# Patient Record
Sex: FEMALE | Race: WHITE | NOT HISPANIC OR LATINO | Employment: FULL TIME | URBAN - NONMETROPOLITAN AREA
[De-identification: names, ages, dates, MRNs, and addresses within clinical notes are randomized per-mention and may not be internally consistent; named-entity substitution may affect disease eponyms.]

---

## 2018-08-07 ENCOUNTER — HOSPITAL ENCOUNTER (EMERGENCY)
Facility: HOSPITAL | Age: 38
Discharge: HOME | End: 2018-08-07
Attending: EMERGENCY MEDICINE | Admitting: EMERGENCY MEDICINE

## 2018-08-07 ENCOUNTER — APPOINTMENT (OUTPATIENT)
Dept: CT IMAGING | Facility: HOSPITAL | Age: 38
End: 2018-08-07

## 2018-08-07 VITALS
HEART RATE: 67 BPM | SYSTOLIC BLOOD PRESSURE: 137 MMHG | TEMPERATURE: 98.3 F | HEIGHT: 66 IN | DIASTOLIC BLOOD PRESSURE: 90 MMHG | OXYGEN SATURATION: 99 % | WEIGHT: 293 LBS | BODY MASS INDEX: 47.09 KG/M2 | RESPIRATION RATE: 16 BRPM

## 2018-08-07 DIAGNOSIS — S16.1XXA STRAIN OF NECK MUSCLE, INITIAL ENCOUNTER: Primary | ICD-10-CM

## 2018-08-07 LAB
B-HCG UR QL: NEGATIVE
INTERNAL NEGATIVE CONTROL: NEGATIVE
INTERNAL POSITIVE CONTROL: POSITIVE
Lab: NORMAL

## 2018-08-07 PROCEDURE — 25010000002 DEXAMETHASONE PER 1 MG: Performed by: EMERGENCY MEDICINE

## 2018-08-07 PROCEDURE — 25010000002 KETOROLAC TROMETHAMINE PER 15 MG: Performed by: EMERGENCY MEDICINE

## 2018-08-07 PROCEDURE — 99283 EMERGENCY DEPT VISIT LOW MDM: CPT

## 2018-08-07 PROCEDURE — 96372 THER/PROPH/DIAG INJ SC/IM: CPT

## 2018-08-07 PROCEDURE — 72125 CT NECK SPINE W/O DYE: CPT

## 2018-08-07 PROCEDURE — 81025 URINE PREGNANCY TEST: CPT | Performed by: EMERGENCY MEDICINE

## 2018-08-07 RX ORDER — DIAZEPAM 2 MG/1
2 TABLET ORAL EVERY 6 HOURS PRN
Qty: 12 TABLET | Refills: 0 | Status: SHIPPED | OUTPATIENT
Start: 2018-08-07

## 2018-08-07 RX ORDER — PREDNISONE 50 MG/1
50 TABLET ORAL DAILY
Qty: 5 TABLET | Refills: 0 | Status: SHIPPED | OUTPATIENT
Start: 2018-08-07

## 2018-08-07 RX ORDER — DEXAMETHASONE SODIUM PHOSPHATE 10 MG/ML
10 INJECTION INTRAMUSCULAR; INTRAVENOUS ONCE
Status: COMPLETED | OUTPATIENT
Start: 2018-08-07 | End: 2018-08-07

## 2018-08-07 RX ORDER — KETOROLAC TROMETHAMINE 30 MG/ML
30 INJECTION, SOLUTION INTRAMUSCULAR; INTRAVENOUS ONCE
Status: COMPLETED | OUTPATIENT
Start: 2018-08-07 | End: 2018-08-07

## 2018-08-07 RX ADMIN — KETOROLAC TROMETHAMINE 30 MG: 30 INJECTION, SOLUTION INTRAMUSCULAR; INTRAVENOUS at 04:20

## 2018-08-07 RX ADMIN — DEXAMETHASONE SODIUM PHOSPHATE 10 MG: 10 INJECTION INTRAMUSCULAR; INTRAVENOUS at 04:21

## 2018-08-07 NOTE — ED PROVIDER NOTES
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 She denies falls or trauma, weakness, numbness, tingling, history of neck surgeries, MVAs, cp, sob or any other issues. She arrives in NAD requesting a \"short of toradol right in my butt.\"         Family, social and past history reviewed as below, prior documentation of H and Ps and other documentation are reviewed:    No past medical history on file.    No past surgical history on file.    Social History    Marital status:              Spouse name:                       Years of education:                 Number of children:               Occupational History    None on file    Social History Main Topics    Smoking status: Not on file                          Smokeless tobacco: Not on file                       Alcohol use: Not on file     Drug use: Unknown    Sexual activity: Not on file          Other Topics            Concern    None on file    Social History Narrative    None on file        Family history: reviewed and non contributory             Review of Systems   All other systems reviewed and are negative.      No past medical history on file.    No Known Allergies    No past surgical history on file.    No family history on file.    Social History     Social History   • Marital status:      Social History Main Topics   • Drug use: Unknown     Other Topics Concern   • Not on file           Objective   Physical Exam   Constitutional: She is oriented to person, place, and time. She appears well-developed and well-nourished.   HENT:   Head: Normocephalic.   Nose: Nose normal.   Eyes: Pupils are equal, round, and reactive to light. Conjunctivae and EOM are normal.   Neck:   There is tenderness and hypertrophy of the right trapezius " muscle, negative Spurlings test on the right, no midline tenderness, no step offs, no deformities, no pain on pulling back of shoulders    Axillary, median, unalr and radial nerves are intact    No atropy    No weakness    strenght of bicep, tricep and forearm muscles are intact    Radial and unlar pulses are 2/4     Sensory intact without noted deficits        Musculoskeletal: Normal range of motion. She exhibits no edema, tenderness or deformity.   Neurological: She is alert and oriented to person, place, and time. She displays normal reflexes. No cranial nerve deficit or sensory deficit. She exhibits normal muscle tone. Coordination normal.   Skin: Skin is warm. Capillary refill takes less than 2 seconds.   Psychiatric: She has a normal mood and affect. Her behavior is normal.   Vitals reviewed.      Procedures           ED Course    CT cervical spine read as no acute process but I do ? Reversal of lordosis on my read.     Differential at this time would include muscle spasm causing radiculopathy. I feel that impingment is less likely given normal neurological examination an no actual trauma. However, will treat with with steroids and muscle relaxers but encourage follow up with possible MRI should patient continue to have symptoms. Patient cooperative with this plan, okay with dc to home.     Cannot take po toradol             CT Cervical Spine Without Contrast    (Results Pending)               MDM      Final diagnoses:   Strain of neck muscle, initial encounter            Brennon Arteaga MD  08/07/18 3922

## 2018-08-07 NOTE — DISCHARGE INSTRUCTIONS
[place],    Your symptoms sound like a muscle strain in your neck. However, if you continue to pain you may need an MRI to rule out any disc injury. Please return for worsening pain, numbness down your arm, weakness to your arm or hand or any other issues.       Follow up with one of the [facility] physician groups below to setup primary care. If you have trouble following up, please call the [facility] Nurse Line at [phone]    (Dr. James Harris DO,  GENNY Downing, and GENNY Maki)  [facility], Primary Care   [address] [place] [address], [address], [place], [place] [id number] [phone]     (Dr. Meg Barbosa MD, GENNY Hinds, and GENNY Pina)  [facility], Primary Care   [address] [place] [id number] [phone]    (Dr. Ray Alba MD and Dr. Maico Fernández MD)  [facility], Primary Care  [address], [address] [phone]    (Dr. Levi Mckeon MD)  [facility], Primary Care  [address] [facility], [place], [place], [address] [phone]          A cervical sprain is a stretch or tear in one or more of the tough, cord-like tissues that connect bones (ligaments) in the neck. Cervical sprains can range from mild to severe. Severe cervical sprains can cause the spinal bones (vertebrae) in the neck to be unstable. This can lead to spinal cord damage and can result in serious nervous system problems.  The amount of time that it takes for a cervical sprain to get better depends on the cause and extent of the injury. Most cervical sprains heal in 4-6 weeks.  What are the causes?  Cervical sprains may be caused by an injury (trauma), such as from a motor vehicle accident, a fall, or sudden forward and backward whipping movement of the head and neck (whiplash injury).  Mild cervical sprains may be caused by wear and tear over  time, such as from poor posture, sitting in a chair that does not provide support, or looking up or down for long periods of time.  What increases the risk?  The following factors may make you more likely to develop this condition:  · Participating in activities that have a high risk of trauma to the neck. These include contact sports, auto racing, gymnastics, and diving.  · Taking risks when driving or riding in a motor vehicle, such as speeding.  · Having osteoarthritis of the spine.  · Having poor strength and flexibility of the neck.  · A previous neck injury.  · Having poor posture.  · Spending a lot of time in certain positions that put stress on the neck, such as sitting at a computer for long periods of time.    What are the signs or symptoms?  Symptoms of this condition include:  · Pain, soreness, stiffness, tenderness, swelling, or a burning sensation in the front, back, or sides of the neck.  · Sudden tightening of neck muscles that you cannot control (muscle spasms).  · Pain in the shoulders or upper back.  · Limited ability to move the neck.  · Headache.  · Dizziness.  · Nausea.  · Vomiting.  · Weakness, numbness, or tingling in a hand or an arm.    Symptoms may develop right away after injury, or they may develop over a few days. In some cases, symptoms may go away with treatment and return (recur) over time.  How is this diagnosed?  This condition may be diagnosed based on:  · Your medical history.  · Your symptoms.  · Any recent injuries or known neck problems that you have, such as arthritis in the neck.  · A physical exam.  · Imaging tests, such as:  ? X-rays.  ? MRI.  ? CT scan.    How is this treated?  This condition is treated by resting and icing the injured area and doing physical therapy exercises. Depending on the severity of your condition, treatment may also include:  · Keeping your neck in place (immobilized) for periods of time. This may be done using:  ? A cervical collar. This supports  your chin and the back of your head.  ? A cervical traction device. This is a sling that holds up your head. This removes weight and pressure from your neck, and it may help to relieve pain.  · Medicines that help to relieve pain and inflammation.  · Medicines that help to relax your muscles (muscle relaxants).  · Surgery. This is rare.    Follow these instructions at home:  If you have a cervical collar:  · Wear it as told by your health care provider. Do not remove the collar unless instructed by your health care provider.  · Ask your health care provider before you make any adjustments to your collar.  · If you have long hair, keep it outside of the collar.  · Ask your health care provider if you can remove the collar for cleaning and bathing. If you are allowed to remove the collar for cleaning or bathing:  ? Follow instructions from your health care provider about how to remove the collar safely.  ? Clean the collar by wiping it with mild soap and water and drying it completely.  ? If your collar has removable pads, remove them every 1-2 days and wash them by hand with soap and water. Let them air-dry completely before you put them back in the collar.  ? Check your skin under the collar for irritation or sores. If you see any, tell your health care provider.  Managing pain, stiffness, and swelling  · If directed, use a cervical traction device as told by your health care provider.  · If directed, apply heat to the affected area before you do your physical therapy or as often as told by your health care provider. Use the heat source that your health care provider recommends, such as a moist heat pack or a heating pad.  ? Place a towel between your skin and the heat source.  ? Leave the heat on for 20-30 minutes.  ? Remove the heat if your skin turns bright red. This is especially important if you are unable to feel pain, heat, or cold. You may have a greater risk of getting burned.  · If directed, put ice on the  affected area:  ? Put ice in a plastic bag.  ? Place a towel between your skin and the bag.  ? Leave the ice on for 20 minutes, 2-3 times a day.  Activity  · Do not drive while wearing a cervical collar. If you do not have a cervical collar, ask your health care provider if it is safe to drive while your neck heals.  · Do not drive or use heavy machinery while taking prescription pain medicine or muscle relaxants, unless your health care provider approves.  · Do not lift anything that is heavier than 10 lb (4.5 kg) until your health care provider tells you that it is safe.  · Rest as directed by your health care provider. Avoid positions and activities that make your symptoms worse. Ask your health care provider what activities are safe for you.  · If physical therapy was prescribed, do exercises as told by your health care provider or physical therapist.  General instructions  · Take over-the-counter and prescription medicines only as told by your health care provider.  · Do not use any products that contain nicotine or tobacco, such as cigarettes and e-cigarettes. These can delay healing. If you need help quitting, ask your health care provider.  · Keep all follow-up visits as told by your health care provider or physical therapist. This is important.  How is this prevented?  To prevent a cervical sprain from happening again:  · Use and maintain good posture. Make any needed adjustments to your workstation to help you use good posture.  · Exercise regularly as directed by your health care provider or physical therapist.  · Avoid risky activities that may cause a cervical sprain.    Contact a health care provider if:  · You have symptoms that get worse or do not get better after 2 weeks of treatment.  · You have pain that gets worse or does not get better with medicine.  · You develop new, unexplained symptoms.  · You have sores or irritated skin on your neck from wearing your cervical collar.  Get help right away  if:  · You have severe pain.  · You develop numbness, tingling, or weakness in any part of your body.  · You cannot move a part of your body (you have paralysis).  · You have neck pain along with:  ? Severe dizziness.  ? Headache.  Summary  · A cervical sprain is a stretch or tear in one or more of the tough, cord-like tissues that connect bones (ligaments) in the neck.  · Cervical sprains may be caused by an injury (trauma), such as from a motor vehicle accident, a fall, or sudden forward and backward whipping movement of the head and neck (whiplash injury).  · Symptoms may develop right away after injury, or they may develop over a few days.  · This condition is treated by resting and icing the injured area and doing physical therapy exercises.  This information is not intended to replace advice given to you by your health care provider. Make sure you discuss any questions you have with your health care provider.  Document Released: 10/14/2008 Document Revised: 08/16/2017 Document Reviewed: 08/16/2017  [employer] Patient Education © 2018 [employer]